# Patient Record
Sex: FEMALE | Race: WHITE | Employment: FULL TIME | URBAN - METROPOLITAN AREA
[De-identification: names, ages, dates, MRNs, and addresses within clinical notes are randomized per-mention and may not be internally consistent; named-entity substitution may affect disease eponyms.]

---

## 2023-07-06 ENCOUNTER — OFFICE VISIT (OUTPATIENT)
Dept: PHYSICAL THERAPY | Facility: MEDICAL CENTER | Age: 63
End: 2023-07-06
Payer: COMMERCIAL

## 2023-07-06 DIAGNOSIS — M54.12 CERVICAL RADICULOPATHY: Primary | ICD-10-CM

## 2023-07-06 DIAGNOSIS — M25.512 LEFT SHOULDER PAIN, UNSPECIFIED CHRONICITY: ICD-10-CM

## 2023-07-06 PROCEDURE — 97161 PT EVAL LOW COMPLEX 20 MIN: CPT | Performed by: PHYSICAL THERAPIST

## 2023-07-06 PROCEDURE — 97140 MANUAL THERAPY 1/> REGIONS: CPT | Performed by: PHYSICAL THERAPIST

## 2023-07-10 ENCOUNTER — OFFICE VISIT (OUTPATIENT)
Dept: PHYSICAL THERAPY | Facility: MEDICAL CENTER | Age: 63
End: 2023-07-10
Payer: COMMERCIAL

## 2023-07-10 DIAGNOSIS — M25.512 LEFT SHOULDER PAIN, UNSPECIFIED CHRONICITY: ICD-10-CM

## 2023-07-10 DIAGNOSIS — M54.12 CERVICAL RADICULOPATHY: Primary | ICD-10-CM

## 2023-07-10 PROCEDURE — 97110 THERAPEUTIC EXERCISES: CPT

## 2023-07-10 PROCEDURE — 97140 MANUAL THERAPY 1/> REGIONS: CPT

## 2023-07-10 NOTE — PROGRESS NOTES
Daily Note     Today's date: 7/10/2023  Patient name: Héctor Knapp  : 1960  MRN: [id number]  Referring provider: Brisa Bansal, PT  Dx:   Encounter Diagnosis     ICD-10-CM    1. Cervical radiculopathy  M54.12       2. Left shoulder pain, unspecified chronicity  M25.512                      Subjective: Pt reports that she felt somewhat better after LV, but was not long lasting. Objective: See treatment diary below      Assessment: Tolerated treatment well. Patient experienced some relief after mobs performed by DPT, AF. Sx's also became more centralized  and less pain after neural glides performed. Educated pt on neural unloading as well this visit. Plan: Continue per plan of care.       Precautions: None      Manuals 7/6 7/10           UPA L with c-spine in flexion, L rot and L SB HK AF  R rot, flex  And R SB           Repeated flexion/L SB/L Rot HK            Radial n glides in R Rot  KO                        Ther Ex 7/6 7/10           Body mechanics training HK            Neural unloading education HK KO           Repeated R rot w/prop and  roll  2x10

## 2023-07-13 ENCOUNTER — OFFICE VISIT (OUTPATIENT)
Dept: PHYSICAL THERAPY | Facility: MEDICAL CENTER | Age: 63
End: 2023-07-13
Payer: COMMERCIAL

## 2023-07-13 DIAGNOSIS — M54.12 CERVICAL RADICULOPATHY: Primary | ICD-10-CM

## 2023-07-13 DIAGNOSIS — M25.512 LEFT SHOULDER PAIN, UNSPECIFIED CHRONICITY: ICD-10-CM

## 2023-07-13 PROCEDURE — 97140 MANUAL THERAPY 1/> REGIONS: CPT | Performed by: PHYSICAL THERAPIST

## 2023-07-13 NOTE — PROGRESS NOTES
Daily Note     Today's date: 2023  Patient name: Ganga Shipman  : 1960  MRN: [id number]  Referring provider: Una Wall, PT  Dx:   Encounter Diagnosis     ICD-10-CM    1. Cervical radiculopathy  M54.12       2. Left shoulder pain, unspecified chronicity  M25.512                      Subjective: Pt reports that she is feeling better. She is having less pain and tingling since last week. Objective: See treatment diary below    Cervical traction trialed, but there was no improvement in her sxs    Pts sxs were eliminated with SL lower cervical PAs and nerve glides    Cervical extension worsened sxs today    Once worsened, her sxs were reduced with SL lower cervical PAs and nerve glides    Assessment: Tolerated treatment well. Patient would benefit from continued PT. Pt responds well to manual treatment. Plan: Continue per plan of care.       Precautions: None      Manuals 7/6 7/10 7/13          UPA L with c-spine in flexion, L rot and L SB HK AF  R rot, flex  And R SB HK perfor-med in SL          Repeated flexion/L SB/L Rot HK            Radial n glides in R Rot  KO HK                       Ther Ex 7/6 7/10 7/13          Body mechanics training HK            Neural unloading education HK KO HK          Repeated R rot w/prop and / roll  2x10 NP

## 2023-07-18 ENCOUNTER — OFFICE VISIT (OUTPATIENT)
Dept: PHYSICAL THERAPY | Facility: MEDICAL CENTER | Age: 63
End: 2023-07-18
Payer: COMMERCIAL

## 2023-07-18 DIAGNOSIS — M25.512 LEFT SHOULDER PAIN, UNSPECIFIED CHRONICITY: ICD-10-CM

## 2023-07-18 DIAGNOSIS — M54.12 CERVICAL RADICULOPATHY: Primary | ICD-10-CM

## 2023-07-18 PROCEDURE — 97140 MANUAL THERAPY 1/> REGIONS: CPT

## 2023-07-18 NOTE — PROGRESS NOTES
Daily Note     Today's date: 2023  Patient name: Nikos Matute  : 1960  MRN: [id number]  Referring provider: Pj Cano, PT  Dx:   Encounter Diagnosis     ICD-10-CM    1. Cervical radiculopathy  M54.12       2. Left shoulder pain, unspecified chronicity  M25.512                      Subjective: Pt reports that her neck is feeling better and has occasional tingling in her arm, but is not the excruciating pain she was having. Pt states that she has learned to modify her positions to avoid pain. Objective: See treatment diary below      Assessment: Tolerated treatment well. Patient demonstrated improved tolerance to manuals and is responding well to current manual therapy. No pain or sx's during tx, just occasional radicular sx's with applied pressure to c-spine or L UT. Pt would benefit from continued PT. Plan: Continue per plan of care.       Precautions: None      Manuals 7/6 7/10 7/13 7/18         UPA L with c-spine in flexion, L rot and L SB HK AF  R rot, flex  And R SB HK perfor-med in SL KO  In R SL and  Supine  W/c-spine in   Flexed position         Repeated flexion/L SB/L Rot HK            Radial n glides in R Rot  KO HK KO                      Ther Ex 7/6 7/10 7/13 7/18         Body mechanics training HK            Neural unloading education HK KO HK          Repeated R rot w/prop and 1/2 roll  2x10 NP KO

## 2023-07-20 ENCOUNTER — OFFICE VISIT (OUTPATIENT)
Dept: PHYSICAL THERAPY | Facility: MEDICAL CENTER | Age: 63
End: 2023-07-20
Payer: COMMERCIAL

## 2023-07-20 DIAGNOSIS — M54.12 CERVICAL RADICULOPATHY: Primary | ICD-10-CM

## 2023-07-20 DIAGNOSIS — M25.512 LEFT SHOULDER PAIN, UNSPECIFIED CHRONICITY: ICD-10-CM

## 2023-07-20 PROCEDURE — 97140 MANUAL THERAPY 1/> REGIONS: CPT

## 2023-07-20 NOTE — PROGRESS NOTES
Daily Note     Today's date: 2023  Patient name: Bobby Colon  : 1960  MRN: [id number]  Referring provider: Messi Man PT  Dx:   Encounter Diagnosis     ICD-10-CM    1. Cervical radiculopathy  M54.12       2. Left shoulder pain, unspecified chronicity  M25.512                      Subjective: Pt reports that she is feeling better since beginning PT a few weeks ago. Pt states that the sharp stabbing pain is no longer there and only returns when carrying things, but is still not as bad. Pt also states that she experiences occasional radicular sx's with just walking. Objective: See treatment diary below      Assessment: Tolerated treatment well. Patient is responding well to manual therapy in R sidebending, rotation and flexion. Pt would benefit from continued PT and manual therapy. Plan: Continue per plan of care.       Precautions: None      Manuals 7/6 7/10 7/13 7/18 7/20        UPA L with c-spine in flexion, L rot and L SB HK AF  R rot, flex  And R SB HK perfor-med in SL KO  In R SL and  Supine  W/c-spine in   Flexed position KO  In R SL  And  Supine  In flexion        Repeated flexion/L SB/L Rot HK            Radial n glides in R Rot  KO HK KO KO                     Ther Ex 7/6 7/10 7/13 7/18 7/20        Body mechanics training HK            Neural unloading education HK KO HK          Repeated R rot w/prop and 1/2 roll  2x10 NP KO KO                                               Modalities             MHP     15'  pre

## 2023-07-24 ENCOUNTER — OFFICE VISIT (OUTPATIENT)
Dept: PHYSICAL THERAPY | Facility: MEDICAL CENTER | Age: 63
End: 2023-07-24
Payer: COMMERCIAL

## 2023-07-24 DIAGNOSIS — M54.12 CERVICAL RADICULOPATHY: Primary | ICD-10-CM

## 2023-07-24 DIAGNOSIS — M25.512 LEFT SHOULDER PAIN, UNSPECIFIED CHRONICITY: ICD-10-CM

## 2023-07-24 PROCEDURE — 97140 MANUAL THERAPY 1/> REGIONS: CPT

## 2023-07-24 PROCEDURE — 97112 NEUROMUSCULAR REEDUCATION: CPT

## 2023-07-24 NOTE — PROGRESS NOTES
Daily Note     Today's date: 2023  Patient name: Jhon North  : 1960  MRN: [id number]  Referring provider: Socorro Rodriguez, PT  Dx:   Encounter Diagnosis     ICD-10-CM    1. Cervical radiculopathy  M54.12       2. Left shoulder pain, unspecified chronicity  M25.512                      Subjective: Pt reports that she experienced increased "stabbing" symptoms while driving herself to PT and also again last night when she was on the floor looking for her dogs toy. Pt's sx's dissipate with R rotation and flexion. Objective: See treatment diary below      Assessment: Tolerated treatment well. Patient demonstrated improved tolerance to manuals with decreased provocation of sx's in R rotation, flexion and sidebend. Plan: Continue per plan of care.       Precautions: None      Manuals 7/6 7/10 7/13 7/18 7/20 7/24       UPA L with c-spine in flexion, L rot and L SB HK AF  R rot, flex  And R SB HK perfor-med in SL KO  In R SL and  Supine  W/c-spine in   Flexed position KO  In R SL  And  Supine  In flexion KO       Repeated flexion/L SB/L Rot HK            Radial n glides in R Rot  KO HK KO KO KO                    Ther Ex 7/6 7/10 7/13 7/18 7/20 7/24       Body mechanics training HK            Neural unloading education HK KO HK          Repeated R rot w/prop and 1/2 roll  2x10 NP KO KO KO       Robberies      3x10       Low rows      3x10       Radial nerve glides      2x10       Modalities            MHP     15'  pre 15'  pre

## 2023-07-27 ENCOUNTER — APPOINTMENT (OUTPATIENT)
Dept: PHYSICAL THERAPY | Facility: MEDICAL CENTER | Age: 63
End: 2023-07-27
Payer: COMMERCIAL

## 2023-07-31 ENCOUNTER — APPOINTMENT (OUTPATIENT)
Dept: PHYSICAL THERAPY | Facility: MEDICAL CENTER | Age: 63
End: 2023-07-31
Payer: COMMERCIAL

## 2023-08-01 ENCOUNTER — OFFICE VISIT (OUTPATIENT)
Dept: PHYSICAL THERAPY | Facility: MEDICAL CENTER | Age: 63
End: 2023-08-01
Payer: COMMERCIAL

## 2023-08-01 DIAGNOSIS — M25.512 LEFT SHOULDER PAIN, UNSPECIFIED CHRONICITY: ICD-10-CM

## 2023-08-01 DIAGNOSIS — M54.12 CERVICAL RADICULOPATHY: Primary | ICD-10-CM

## 2023-08-01 PROCEDURE — 97140 MANUAL THERAPY 1/> REGIONS: CPT

## 2023-08-01 PROCEDURE — 97112 NEUROMUSCULAR REEDUCATION: CPT

## 2023-08-01 PROCEDURE — 97110 THERAPEUTIC EXERCISES: CPT

## 2023-08-01 NOTE — PROGRESS NOTES
Daily Note     Today's date: 2023  Patient name: Nieves Forbes  : 1960  MRN: [id number]  Referring provider: Gita Soni, PT  Dx:   Encounter Diagnosis     ICD-10-CM    1. Cervical radiculopathy  M54.12       2. Left shoulder pain, unspecified chronicity  M25.512                      Subjective: Pt reports that she experienced relief of her sx's which lasted for a few days. Pt states that her sx's returned when she was driving to PT tonight and had to look up while driving. Objective: See treatment diary below      Assessment: Tolerated treatment well. Patient demonstrated fatigue post treatment, exhibited good technique with therapeutic exercises and would benefit from continued PT  Pt is responding well to current tx plan. Plan: Continue per plan of care.       Precautions: None      Manuals 7/6 7/10 7/13 7/18 7/20 7/24 8/1      UPA L with c-spine in flexion, L rot and L SB HK AF  R rot, flex  And R SB HK perfor-med in SL KO  In R SL and  Supine  W/c-spine in   Flexed position KO  In R SL  And  Supine  In flexion KO KO      Repeated flexion/L SB/L Rot HK            Radial n glides in R Rot  KO HK KO KO KO Sup  KO                   Ther Ex 7/6 7/10 7/13 7/18 7/20 7/24 8/1      Body mechanics training HK            Neural unloading education HK KO HK          Repeated R rot w/prop and 1/2 roll  2x10 NP KO KO KO KO      Robberies      3x10 3x10      Low rows      3x10 3x10      Scap squeezes       3x10      Radial glides       x20                   Radial nerve glides      2x10 NP      Modalities           MHP     15'  pre 15'  pre 15'  pre

## 2023-08-03 ENCOUNTER — OFFICE VISIT (OUTPATIENT)
Dept: PHYSICAL THERAPY | Facility: MEDICAL CENTER | Age: 63
End: 2023-08-03
Payer: COMMERCIAL

## 2023-08-03 DIAGNOSIS — M25.512 LEFT SHOULDER PAIN, UNSPECIFIED CHRONICITY: ICD-10-CM

## 2023-08-03 DIAGNOSIS — M54.12 CERVICAL RADICULOPATHY: Primary | ICD-10-CM

## 2023-08-03 PROCEDURE — 97110 THERAPEUTIC EXERCISES: CPT

## 2023-08-03 PROCEDURE — 97140 MANUAL THERAPY 1/> REGIONS: CPT

## 2023-08-03 PROCEDURE — 97112 NEUROMUSCULAR REEDUCATION: CPT

## 2023-08-03 NOTE — PROGRESS NOTES
Daily Note     Today's date: 8/3/2023  Patient name: Nikos Matute  : 1960  MRN: [id number]  Referring provider: Pj Cano, PT  Dx:   Encounter Diagnosis     ICD-10-CM    1. Cervical radiculopathy  M54.12       2. Left shoulder pain, unspecified chronicity  M25.512                      Subjective: Pt reports that her sx's are definitely decreasing, but the "sledge hammer" pain occurs when driving or when carrying a basket of laundry. Objective: See treatment diary below      Assessment: Tolerated treatment well. Patient demonstrated fatigue post treatment, exhibited good technique with therapeutic exercises and would benefit from continued PT  Pt is finding relief w/ manual therapy and in R side bend, rotation position. Plan: Continue per plan of care.       Precautions: None      Manuals 7/6 7/10 7/13 7/18 7/20 7/24 8/1 8/3     UPA L with c-spine in flexion, L rot and L SB HK AF  R rot, flex  And R SB HK perfor-med in SL KO  In R SL and  Supine  W/c-spine in   Flexed position KO  In R SL  And  Supine  In flexion KO KO KO     Repeated flexion/L SB/L Rot HK            Radial n glides in R Rot  KO HK KO KO KO Sup  KO Sup  KO                  Ther Ex 7/6 7/10 7/13 7/18 7/20 7/24 8/1 8/3     Body mechanics training HK            Neural unloading education HK KO HK          Repeated R rot w/prop and 1/2 roll  2x10 NP KO KO KO KO KO     Robberies      3x10 3x10 3x10     Low rows      3x10 3x10 3x10     Scap squeezes       3x10 3x10     Radial glides       x20 x30                  Radial nerve glides      2x10 NP      Modalities      8 8/3     MHP     15'  pre 15'  pre 15'  pre 15'  Pre

## 2023-08-08 ENCOUNTER — APPOINTMENT (OUTPATIENT)
Dept: PHYSICAL THERAPY | Facility: MEDICAL CENTER | Age: 63
End: 2023-08-08
Payer: COMMERCIAL

## 2023-08-10 ENCOUNTER — OFFICE VISIT (OUTPATIENT)
Dept: PHYSICAL THERAPY | Facility: MEDICAL CENTER | Age: 63
End: 2023-08-10
Payer: COMMERCIAL

## 2023-08-10 DIAGNOSIS — M25.512 LEFT SHOULDER PAIN, UNSPECIFIED CHRONICITY: ICD-10-CM

## 2023-08-10 DIAGNOSIS — M54.12 CERVICAL RADICULOPATHY: Primary | ICD-10-CM

## 2023-08-10 PROCEDURE — 97140 MANUAL THERAPY 1/> REGIONS: CPT | Performed by: PHYSICAL THERAPIST

## 2023-08-10 NOTE — PROGRESS NOTES
Daily Note     Today's date: 8/10/2023  Patient name: Darya Jefferson  : 1960  MRN: [id number]  Referring provider: Bryan Owusu PT  Dx:   Encounter Diagnosis     ICD-10-CM    1. Cervical radiculopathy  M54.12       2. Left shoulder pain, unspecified chronicity  M25.512                      Subjective: Pt reports that her sxs are definitely improved. There are long stretches of time when she doesn't have pain. Pt has the most pain with walking, but it is not nearly the same intensity as when she started PT. Objective: See treatment diary below    SB R completely relieves the patients pain as was instructed to do this at home when sxs present themselves    Assessment: Tolerated treatment well. Patient would benefit from continued PT      Plan: Continue per plan of care.       Precautions: None      Manuals 7/6 7/10 7/13 7/18 7/20 7/24 8/1 8/3 8/10    UPA L with c-spine in flexion, L rot and L SB HK AF  R rot, flex  And R SB HK perfor-med in SL KO  In R SL and  Supine  W/c-spine in   Flexed position KO  In R SL  And  Supine  In flexion KO KO KO HK  mobs    Repeated flexion/L SB/L Rot HK            Radial n glides in R Rot  KO HK KO KO KO Sup  KO Sup  KO HK  R, U, M                 Ther Ex 7/6 7/10 7/13 7/18 7/20 7/24 8/1 8/3 8/10    Body mechanics training HK            Neural unloading education HK KO HK          Repeated R rot w/prop and / roll  2x10 NP KO KO KO KO KO NP    Robberies      3x10 3x10 3x10 NP    Low rows      3x10 3x10 3x10 NP    Scap squeezes       3x10 3x10 NP    Radial glides       x20 x30 NP    SB R         3x10    Radial nerve glides      2x10 NP      Modalities     7/20 7/24 8/1 8/3 8/11    MHP     15'  pre 15'  pre 15'  pre 15'  Pre 15'  pre

## 2023-08-15 ENCOUNTER — OFFICE VISIT (OUTPATIENT)
Dept: PHYSICAL THERAPY | Facility: MEDICAL CENTER | Age: 63
End: 2023-08-15
Payer: COMMERCIAL

## 2023-08-15 DIAGNOSIS — M25.512 LEFT SHOULDER PAIN, UNSPECIFIED CHRONICITY: ICD-10-CM

## 2023-08-15 DIAGNOSIS — M54.12 CERVICAL RADICULOPATHY: Primary | ICD-10-CM

## 2023-08-15 PROCEDURE — 97140 MANUAL THERAPY 1/> REGIONS: CPT

## 2023-08-15 NOTE — PROGRESS NOTES
Daily Note     Today's date: 8/15/2023  Patient name: Jose Ware  : 1960  MRN: [id number]  Referring provider: Ricco Meyers, PT  Dx:   Encounter Diagnosis     ICD-10-CM    1. Cervical radiculopathy  M54.12       2. Left shoulder pain, unspecified chronicity  M25.512                      Subjective: Pt reports that she feels much better and was able to do more activities at home such as cook,  laundry, cleaning and bathe her dog. Objective: See treatment diary below      Assessment: Tolerated treatment well. Patient is responding well to manual therapy and her sx's are resolving. Pt would benefit from continued PT. Plan: Continue per plan of care.       Precautions: None      Manuals 7/6 7/10 7/13 7/18 7/20 7/24 8/1 8/3 8/10 8/15   UPA L with c-spine in flexion, L rot and L SB HK AF  R rot, flex  And R SB HK perfor-med in SL KO  In R SL and  Supine  W/c-spine in   Flexed position KO  In R SL  And  Supine  In flexion KO KO KO HK  mobs KO   Repeated flexion/L SB/L Rot HK            Radial n glides in R Rot  KO HK KO KO KO Sup  KO Sup  KO HK  R, U, M KO                Ther Ex 7/6 7/10 7/13 7/18 7/20 7/24 8/1 8/3 8/10 8/15   Body mechanics training HK            Neural unloading education HK KO HK          Repeated R rot w/prop and 1/2 roll  2x10 NP KO KO KO KO KO NP NP   Robberies      3x10 3x10 3x10 NP NP   Low rows      3x10 3x10 3x10 NP NP   Scap squeezes       3x10 3x10 NP NP   Radial glides       x20 x30 NP NP   SB R         3x10 NP   Radial nerve glides      2x10 NP      Modalities     7/20 7/24 8/1 8/3 8/11 8/15   MHP     15'  pre 15'  pre 15'  pre 15'  Pre 15'  pre 15'  Pre

## 2023-08-17 ENCOUNTER — APPOINTMENT (OUTPATIENT)
Dept: PHYSICAL THERAPY | Facility: MEDICAL CENTER | Age: 63
End: 2023-08-17
Payer: COMMERCIAL

## 2023-08-22 ENCOUNTER — APPOINTMENT (OUTPATIENT)
Dept: PHYSICAL THERAPY | Facility: MEDICAL CENTER | Age: 63
End: 2023-08-22
Payer: COMMERCIAL

## 2023-08-24 ENCOUNTER — APPOINTMENT (OUTPATIENT)
Dept: PHYSICAL THERAPY | Facility: MEDICAL CENTER | Age: 63
End: 2023-08-24
Payer: COMMERCIAL

## 2023-08-29 ENCOUNTER — OFFICE VISIT (OUTPATIENT)
Dept: PHYSICAL THERAPY | Facility: MEDICAL CENTER | Age: 63
End: 2023-08-29
Payer: COMMERCIAL

## 2023-08-29 DIAGNOSIS — M54.12 CERVICAL RADICULOPATHY: Primary | ICD-10-CM

## 2023-08-29 DIAGNOSIS — M25.512 LEFT SHOULDER PAIN, UNSPECIFIED CHRONICITY: ICD-10-CM

## 2023-08-29 PROCEDURE — 97140 MANUAL THERAPY 1/> REGIONS: CPT

## 2023-08-29 NOTE — PROGRESS NOTES
Daily Note     Today's date: 2023  Patient name: Milla Chang  : 1960  MRN: [id number]  Referring provider: Sandra Fowler PT  Dx:   Encounter Diagnosis     ICD-10-CM    1. Cervical radiculopathy  M54.12       2. Left shoulder pain, unspecified chronicity  M25.512                      Subjective: Pt is pleased to report that she is feeling much better. Pt can lift heavier bags of groceries, lay flat on her back, cook and clean for longer periods of time and even turn her head to the left without increasing her sx's. Objective: See treatment diary below      Assessment: Tolerated treatment well. Patient demonstrated fatigue post treatment, exhibited good technique with therapeutic exercises and would benefit from continued PT  Pt is making steady progress towards her goals and is experiencing  decreased intensity and frequency of her radicular sx's. Plan: Continue per plan of care.       Precautions: None      Manuals             UPA L with c-spine in flexion, L rot and L SB NP                         Radial, median and ulnar glides KO                         Ther Ex                                                    Robberies NP            Low rows NP            Scap squeezes NP                                                   Modalities             MHP 15'  pre

## 2023-08-31 ENCOUNTER — APPOINTMENT (OUTPATIENT)
Dept: PHYSICAL THERAPY | Facility: MEDICAL CENTER | Age: 63
End: 2023-08-31
Payer: COMMERCIAL

## 2023-12-12 ENCOUNTER — APPOINTMENT (OUTPATIENT)
Dept: RADIOLOGY | Facility: CLINIC | Age: 63
End: 2023-12-12
Payer: COMMERCIAL

## 2023-12-12 DIAGNOSIS — U07.1 COVID-19: ICD-10-CM

## 2023-12-12 PROCEDURE — 71046 X-RAY EXAM CHEST 2 VIEWS: CPT

## 2025-08-16 ENCOUNTER — OFFICE VISIT (OUTPATIENT)
Age: 65
End: 2025-08-16